# Patient Record
Sex: FEMALE | Race: WHITE | NOT HISPANIC OR LATINO | Employment: OTHER | URBAN - METROPOLITAN AREA
[De-identification: names, ages, dates, MRNs, and addresses within clinical notes are randomized per-mention and may not be internally consistent; named-entity substitution may affect disease eponyms.]

---

## 2017-08-10 ENCOUNTER — TELEPHONE (OUTPATIENT)
Dept: ORTHOPEDICS | Facility: CLINIC | Age: 67
End: 2017-08-10

## 2017-08-10 NOTE — TELEPHONE ENCOUNTER
----- Message from Ynes Mercado sent at 8/10/2017  4:11 PM CDT -----  Contact: Dr Norma Rogers  Good afternoon all,    Please see the referral and records in  for pt to be referred to Ortho (Dr Cole specifically) for Breast cancer stage 4. Yaisa, I looped you in just in case Dr Cole is the wrong physician to treat. Please let me know who will see the pt asap.  Thanks!  Ynes

## 2017-08-21 ENCOUNTER — HOSPITAL ENCOUNTER (OUTPATIENT)
Dept: RADIOLOGY | Facility: HOSPITAL | Age: 67
Discharge: HOME | End: 2017-08-21
Attending: ORTHOPAEDIC SURGERY
Payer: MEDICARE

## 2017-08-21 ENCOUNTER — OFFICE VISIT (OUTPATIENT)
Dept: ORTHOPEDICS | Facility: CLINIC | Age: 67
End: 2017-08-21
Payer: MEDICARE

## 2017-08-21 VITALS — HEIGHT: 64 IN | WEIGHT: 116 LBS | BODY MASS INDEX: 19.81 KG/M2

## 2017-08-21 DIAGNOSIS — M75.92 LESION OF SHOULDER, LEFT: ICD-10-CM

## 2017-08-21 DIAGNOSIS — M75.92 LESION OF SHOULDER, LEFT: Primary | ICD-10-CM

## 2017-08-21 DIAGNOSIS — C79.9 METASTATIC CARCINOMA: ICD-10-CM

## 2017-08-21 PROCEDURE — 99999 PR PBB SHADOW E&M-EST. PATIENT-LVL II: CPT | Mod: PBBFAC,,, | Performed by: ORTHOPAEDIC SURGERY

## 2017-08-21 PROCEDURE — 73030 X-RAY EXAM OF SHOULDER: CPT | Mod: TC,LT

## 2017-08-21 PROCEDURE — 99203 OFFICE O/P NEW LOW 30 MIN: CPT | Mod: S$PBB,,, | Performed by: ORTHOPAEDIC SURGERY

## 2017-08-21 PROCEDURE — 73030 X-RAY EXAM OF SHOULDER: CPT | Mod: 26,LT,, | Performed by: RADIOLOGY

## 2017-08-21 RX ORDER — NAPROXEN SODIUM 220 MG/1
81 TABLET, FILM COATED ORAL DAILY
COMMUNITY

## 2017-08-21 RX ORDER — FLUOXETINE HYDROCHLORIDE 20 MG/1
20 CAPSULE ORAL DAILY
COMMUNITY

## 2017-08-21 RX ORDER — METOPROLOL TARTRATE 25 MG/1
25 TABLET, FILM COATED ORAL 2 TIMES DAILY
COMMUNITY

## 2017-08-21 RX ORDER — OXYCODONE HCL 5 MG/5 ML
SOLUTION, ORAL ORAL
COMMUNITY

## 2017-08-21 RX ORDER — LORAZEPAM 2 MG/1
0.5 TABLET ORAL EVERY 6 HOURS PRN
COMMUNITY

## 2017-08-21 RX ORDER — GABAPENTIN 100 MG/1
100 CAPSULE ORAL 3 TIMES DAILY
COMMUNITY

## 2017-08-21 RX ORDER — OXYCODONE HCL 10 MG/1
10 TABLET, FILM COATED, EXTENDED RELEASE ORAL EVERY 12 HOURS PRN
COMMUNITY

## 2017-08-21 RX ORDER — ATORVASTATIN CALCIUM 20 MG/1
20 TABLET, FILM COATED ORAL DAILY
COMMUNITY

## 2017-08-21 NOTE — LETTER
August 21, 2017      Norma Rogers MD  [address]  [address]  [facility] [address]           [facility]  [address]  [facility] [address]  Phone: [phone]          Patient: Ana Wolf   MR Number: [id number]   YOB: 1950   Date of Visit: 8/21/2017       Dear Dr. Norma Rogers:    Thank you for referring Ana Wolf to me for evaluation. Attached you will find relevant portions of my assessment and plan of care.    If you have questions, please do not hesitate to call me. I look forward to following Ana Wolf along with you.    Sincerely,    Ron Cole MD    Enclosure  CC:  No Recipients    If you would like to receive this communication electronically, please contact [email or web address] or [phone] to request more information on [facility] access.    For providers and/or their staff who would like to refer a patient to [facility], please contact us through our one-stop-shop provider referral line, [facility], at [phone].    If you feel you have received this communication in error or would no longer like to receive these types of communications, please e-mail [email or web address]

## 2017-08-21 NOTE — PROGRESS NOTES
CHIEF COMPLAINT:  left upper extremity soft-tissue mass.                                                                                                                                         HISTORY OF PRESENT ILLNESS:  The patient is a 67 y.o. female with h/o HTN, HLD, depression, stage IV breast cancer with metastasis to LUE who presents  for evaluation of her left upper extremity mass.   Had B mastectomy ~8 years ago.  Recurrence to LUE lymph nodes 2 years ago.  Noticed mass 1/2017.  Associated with severe burning pain in radial nerve distribution and loss of motor function of radial nerve.  Has completed chemotherapy, which decreased size of mass.  Scheduled to begin radiation therapy soon.  We are consulted to evaluate surgical options.    Pain Duration: 8 months  Pain Quality: burning  Pain Context:worsening  Pain Timing: constant  Pain Location:lateral arm and radiation of pain distally  Pain Severity: moderate    Night pain: Positive    Growth: Yes    History of skin warmth/erythema/changes: Yes    She  presents for further treatment recommendations.   She denies cough, sputum production, shortness of breath,   fever, chills, night sweats or weight loss.  She reports distal paresthesias.  She has no discreet history of prior trauma in the area.                                                                                                         PAST MEDICAL HISTORY:  No past medical history on file..                                               PAST SURGICAL HISTORY:  No past surgical history on file.      Current Outpatient Prescriptions:     aspirin 81 MG Chew, Take 81 mg by mouth once daily., Disp: , Rfl:     atorvastatin (LIPITOR) 20 MG tablet, Take 20 mg by mouth once daily., Disp: , Rfl:     fluoxetine (PROZAC) 20 MG capsule, Take 20 mg by mouth once daily., Disp: , Rfl:     gabapentin (NEURONTIN) 100 MG capsule, Take 100 mg by mouth 3 (three) times daily., Disp: , Rfl:     lorazepam (ATIVAN)  2 MG Tab, Take 0.5 mg by mouth every 6 (six) hours as needed., Disp: , Rfl:     metoprolol tartrate (LOPRESSOR) 25 MG tablet, Take 25 mg by mouth 2 (two) times daily., Disp: , Rfl:     oxycodone (OXYCONTIN) 10 mg 12 hr tablet, Take 10 mg by mouth every 12 (twelve) hours as needed for Pain., Disp: , Rfl:     oxycodone (ROXICODONE) 5 mg/5 mL Soln, Take by mouth., Disp: , Rfl:     SOCIAL HISTORY:  Reviewed per EPIC history for tobacco or alcohol use and she  is an active 67 y.o.  female                                                                             FAMILY MEDICAL HISTORY:  family history is not on file.                                                                                                                                                         PHYSICAL EXAMINATION:                                                        GENERAL:  A well-developed, well-nourished 67 y.o. female who is alert and       oriented in no acute distress.      Gait: She  walks with a normal gait.                   EXTREMITIES:  Examination of the extremities reveals there is a visible mass of the proximal lateral arm in the region of the posterior deltoid.  ~8x6 cm.  Firm, immobile.  TTP.  Skin warmth.  Skin intact.    The skin over both upper extremities is normal and unremarkable.  She has a   painless range of motion of the shoulders, elbows and wrists            bilaterally.  There are palpable lymph nodes in medial proximal arm.  Sensation is     Decreased in radial nerve distribution.   No L radial nerve motor function.  AIN/U intact.  Radial pulses are palpable distally bilaterally.    She has no edema.    She has a palpable mass.                             AP internal and external radiographs of the left shoulder were ordered and personally reviewed with the patient today.  These are unremarkable.                                                                                 IMPRESSION: Metastatic carcinoma  left shoulder.                                                                                                                   PLAN:  I would recommend proceeding with RT.  If palliation is not adequate could consider later surgical resection, but I could not say with any certainty that pain or function would be improved.    I have personally interviewed and evaluated the patient.  I have reviewed the resident physician's note and I concur with the findings.